# Patient Record
(demographics unavailable — no encounter records)

---

## 2025-01-27 NOTE — HISTORY OF PRESENT ILLNESS
[FreeTextEntry1] : 12/21/2023 cc retention 55 yo male h/o bph on tamsulosin bid possibly finasteride lastr guillermina episode of retention went to [facility] had [facility] third episode  no dysuria had episode of hematuria nl cysto  01/24/2025 cc f/u visit Pt is a 60 year old male presenting for f/u visit. Pt reports losing weight. Pt reports his urination is not bad, self catheterized about 3x during the last year. Pt reports having to push to urinate. Pt reports being unable to hold the urge to urinate. Pt reports having accidents and wearing a pad everyday.  Pt reports taking finasteride and tamsulosin. has cathed a few times

## 2025-01-27 NOTE — HISTORY OF PRESENT ILLNESS
[FreeTextEntry1] : 12/21/2023 cc retention 55 yo male h/o bph on tamsulosin bid possibly finasteride lastr guillermina episode of retention went to [place] [facility] had [facility] third episode  no dysuria had episode of hematuria nl cysto  01/24/2025 cc f/u visit Pt is a 60 year old male presenting for f/u visit. Pt reports losing weight. Pt reports his urination is not bad, self catheterized about 3x during the last year. Pt reports having to push to urinate. Pt reports being unable to hold the urge to urinate. Pt reports having accidents and wearing a pad everyday.  Pt reports taking finasteride and tamsulosin. has cathed a few times

## 2025-01-27 NOTE — ASSESSMENT
[FreeTextEntry1] : Pt is a 60 year old male with BPH with urinary obstruction and lower urinary tract symptoms reviewed eval for prostate procedure - defers    UA and culture done today  BMP and PSA drawn today Rx for finasteride and tamsulosin    Patient is being seen today for evaluation and management of a chronic and longitudinal ongoing condition and I am of the primary treating physician.

## 2025-01-27 NOTE — END OF VISIT
[FreeTextEntry4] : This note was written by Dante Recio on 01/24/2025 actively solely Lico Taylor M.D. I, Dante Recio, am scribing for and in the presence of Lico Taylor M.D. in the following sections HISTORY OF PRESENT ILLNESS, PAST MEDICAL/FAMILY/SOCIAL HISTORY; REVIEW OF SYSTEMS; VITAL SIGNS; PHYSICAL EXAM; ASSESSMENT/PLAN. All medical record entries made by this scribe at , Lico Taylor M.D. direction and personally dictated by me on 01/24/2025. I personally performed the services described in the documentation, reviewed the documentation recorded by the scribe in my presence, and it accurately and completely records my words and actions.

## 2025-03-25 NOTE — HEALTH RISK ASSESSMENT
[Fair] :  ~his/her~ mood as fair [0] : 2) Feeling down, depressed, or hopeless: Not at all (0) [PHQ-2 Negative - No further assessment needed] : PHQ-2 Negative - No further assessment needed [Never] : Never [HIV test declined] : HIV test declined [Hepatitis C test declined] : Hepatitis C test declined [Alone] : lives alone [Employed] : employed [[id number]] : 0 [de-identified] : [facility]

## 2025-03-25 NOTE — HISTORY OF PRESENT ILLNESS
[de-identified] : 61 yo male here for CPE.  Following with Uro for recurrent urinary retention. Has had to go to the hospital several times to get a catheter placed. H/o BPH on tamsulosin BID and finasteride. Had cystoscopy which showed no mass, just BPH. Last uro appt January.  Patient with history of positive FIT testing. Still hasn't had colonoscopy at this point.  Reporting some dizziness on and off over the last few years as well as dyspnea on exertion. Sometimes does report chest pressure with taking a deep breath. Has seen Neuro for dizziness in the past, had negative workup for stroke in the past. No CP or SOB today. Mild headaches on and off. Saw Cardio last year who recommended possible nuclear stress test. Pt hasn't followed up since 2022.  H/o HTN with controlled BP today. Needs meds rx.  Admitting to cough, sore throat, postnasal drip for the last week. Mucous is clear. Some chronic dyspnea on exertion. No wheezing. Has gotten worse the last few days. Concerned about mold exposure.  Was seen in [facility] ER for constipation in February, reports possible cyst that burst at that time. Had seen GI before, never got to go for colonoscopy before. Will follow up. Denying blood in stool. Had CT abd which showed possible abscess, underlying fistulous tract couldn't be excluded. Recommended perianal and pelvic MRI for futher evaluation. MRI was ordered by Uro. Pt will schedule.

## 2025-03-25 NOTE — ASSESSMENT
[FreeTextEntry1] : CPE- Well exam, comprehensive labs ordered. STD panel declined. EKG NSR with t wave changes but stable from prior. Colonoscopy encouraged. Follow up labs, drawn today.

## 2025-03-25 NOTE — REVIEW OF SYSTEMS
[Cough] : cough [Dyspnea on Exertion] : dyspnea on exertion [Headache] : headache [Dizziness] : dizziness [Negative] : Heme/Lymph